# Patient Record
Sex: FEMALE | Race: WHITE | URBAN - METROPOLITAN AREA
[De-identification: names, ages, dates, MRNs, and addresses within clinical notes are randomized per-mention and may not be internally consistent; named-entity substitution may affect disease eponyms.]

---

## 2017-12-05 ENCOUNTER — INPATIENT (INPATIENT)
Facility: HOSPITAL | Age: 67
LOS: 2 days | Discharge: HOME | End: 2017-12-08
Attending: SPECIALIST | Admitting: SPECIALIST
Payer: MEDICAID

## 2017-12-05 VITALS
HEIGHT: 63 IN | DIASTOLIC BLOOD PRESSURE: 73 MMHG | WEIGHT: 139.99 LBS | SYSTOLIC BLOOD PRESSURE: 132 MMHG | TEMPERATURE: 101 F | OXYGEN SATURATION: 100 % | RESPIRATION RATE: 14 BRPM | HEART RATE: 115 BPM

## 2017-12-05 DIAGNOSIS — A41.9 SEPSIS, UNSPECIFIED ORGANISM: ICD-10-CM

## 2017-12-05 DIAGNOSIS — Z90.49 ACQUIRED ABSENCE OF OTHER SPECIFIED PARTS OF DIGESTIVE TRACT: Chronic | ICD-10-CM

## 2017-12-05 LAB
ALBUMIN SERPL ELPH-MCNC: 4.2 G/DL — SIGNIFICANT CHANGE UP (ref 3.3–5)
ALP SERPL-CCNC: 180 U/L — HIGH (ref 40–120)
ALT FLD-CCNC: 163 U/L — HIGH (ref 4–33)
ANISOCYTOSIS BLD QL: SIGNIFICANT CHANGE UP
APPEARANCE UR: SIGNIFICANT CHANGE UP
APTT BLD: 29.3 SEC — SIGNIFICANT CHANGE UP (ref 27.5–37.4)
AST SERPL-CCNC: 237 U/L — HIGH (ref 4–32)
BASE EXCESS BLDV CALC-SCNC: 2.9 MMOL/L — SIGNIFICANT CHANGE UP
BASOPHILS # BLD AUTO: 0.01 K/UL — SIGNIFICANT CHANGE UP (ref 0–0.2)
BASOPHILS NFR BLD AUTO: 0.1 % — SIGNIFICANT CHANGE UP (ref 0–2)
BASOPHILS NFR SPEC: 0 % — SIGNIFICANT CHANGE UP (ref 0–2)
BILIRUB SERPL-MCNC: 5.4 MG/DL — HIGH (ref 0.2–1.2)
BILIRUB UR-MCNC: NEGATIVE — SIGNIFICANT CHANGE UP
BLASTS # FLD: 0 % — SIGNIFICANT CHANGE UP (ref 0–0)
BLD GP AB SCN SERPL QL: NEGATIVE — SIGNIFICANT CHANGE UP
BLOOD GAS VENOUS - CREATININE: SIGNIFICANT CHANGE UP MG/DL (ref 0.5–1.3)
BLOOD UR QL VISUAL: HIGH
BUN SERPL-MCNC: 13 MG/DL — SIGNIFICANT CHANGE UP (ref 7–23)
CALCIUM SERPL-MCNC: 9.6 MG/DL — SIGNIFICANT CHANGE UP (ref 8.4–10.5)
CHLORIDE BLDV-SCNC: 103 MMOL/L — SIGNIFICANT CHANGE UP (ref 96–108)
CHLORIDE SERPL-SCNC: 98 MMOL/L — SIGNIFICANT CHANGE UP (ref 98–107)
CO2 SERPL-SCNC: 25 MMOL/L — SIGNIFICANT CHANGE UP (ref 22–31)
COLOR SPEC: YELLOW — SIGNIFICANT CHANGE UP
CREAT SERPL-MCNC: 0.92 MG/DL — SIGNIFICANT CHANGE UP (ref 0.5–1.3)
DACRYOCYTES BLD QL SMEAR: SLIGHT — SIGNIFICANT CHANGE UP
EOSINOPHIL # BLD AUTO: 0.01 K/UL — SIGNIFICANT CHANGE UP (ref 0–0.5)
EOSINOPHIL NFR BLD AUTO: 0.1 % — SIGNIFICANT CHANGE UP (ref 0–6)
EOSINOPHIL NFR FLD: 0 % — SIGNIFICANT CHANGE UP (ref 0–6)
GAS PNL BLDV: 139 MMOL/L — SIGNIFICANT CHANGE UP (ref 136–146)
GIANT PLATELETS BLD QL SMEAR: PRESENT — SIGNIFICANT CHANGE UP
GLUCOSE BLDV-MCNC: 104 — HIGH (ref 70–99)
GLUCOSE SERPL-MCNC: 105 MG/DL — HIGH (ref 70–99)
GLUCOSE UR-MCNC: NEGATIVE — SIGNIFICANT CHANGE UP
HCO3 BLDV-SCNC: 25 MMOL/L — SIGNIFICANT CHANGE UP (ref 20–27)
HCT VFR BLD CALC: 34.8 % — SIGNIFICANT CHANGE UP (ref 34.5–45)
HCT VFR BLDV CALC: 34.6 % — SIGNIFICANT CHANGE UP (ref 34.5–45)
HGB BLD-MCNC: 10.7 G/DL — LOW (ref 11.5–15.5)
HGB BLDV-MCNC: 11.2 G/DL — LOW (ref 11.5–15.5)
HYPOCHROMIA BLD QL: SLIGHT — SIGNIFICANT CHANGE UP
IMM GRANULOCYTES # BLD AUTO: 0.04 # — SIGNIFICANT CHANGE UP
IMM GRANULOCYTES NFR BLD AUTO: 0.4 % — SIGNIFICANT CHANGE UP (ref 0–1.5)
INR BLD: 1.05 — SIGNIFICANT CHANGE UP (ref 0.88–1.17)
KETONES UR-MCNC: SIGNIFICANT CHANGE UP
LACTATE BLDV-MCNC: 1.7 MMOL/L — SIGNIFICANT CHANGE UP (ref 0.5–2)
LEUKOCYTE ESTERASE UR-ACNC: HIGH
LIDOCAIN IGE QN: 26.3 U/L — SIGNIFICANT CHANGE UP (ref 7–60)
LYMPHOCYTES # BLD AUTO: 1.88 K/UL — SIGNIFICANT CHANGE UP (ref 1–3.3)
LYMPHOCYTES # BLD AUTO: 18 % — SIGNIFICANT CHANGE UP (ref 13–44)
LYMPHOCYTES NFR SPEC AUTO: 13 % — SIGNIFICANT CHANGE UP (ref 13–44)
MCHC RBC-ENTMCNC: 20.9 PG — LOW (ref 27–34)
MCHC RBC-ENTMCNC: 30.7 % — LOW (ref 32–36)
MCV RBC AUTO: 67.8 FL — LOW (ref 80–100)
METAMYELOCYTES # FLD: 0 % — SIGNIFICANT CHANGE UP (ref 0–1)
MICROCYTES BLD QL: SIGNIFICANT CHANGE UP
MONOCYTES # BLD AUTO: 0.78 K/UL — SIGNIFICANT CHANGE UP (ref 0–0.9)
MONOCYTES NFR BLD AUTO: 7.5 % — SIGNIFICANT CHANGE UP (ref 2–14)
MONOCYTES NFR BLD: 3.5 % — SIGNIFICANT CHANGE UP (ref 2–9)
MUCOUS THREADS # UR AUTO: SIGNIFICANT CHANGE UP
MYELOCYTES NFR BLD: 0 % — SIGNIFICANT CHANGE UP (ref 0–0)
NEUTROPHIL AB SER-ACNC: 78.3 % — HIGH (ref 43–77)
NEUTROPHILS # BLD AUTO: 7.7 K/UL — HIGH (ref 1.8–7.4)
NEUTROPHILS NFR BLD AUTO: 73.9 % — SIGNIFICANT CHANGE UP (ref 43–77)
NEUTS BAND # BLD: 0 % — SIGNIFICANT CHANGE UP (ref 0–6)
NITRITE UR-MCNC: NEGATIVE — SIGNIFICANT CHANGE UP
NON-SQ EPI CELLS # UR AUTO: 3 — SIGNIFICANT CHANGE UP
NRBC # FLD: 0 — SIGNIFICANT CHANGE UP
OTHER - HEMATOLOGY %: 0 — SIGNIFICANT CHANGE UP
OVALOCYTES BLD QL SMEAR: SLIGHT — SIGNIFICANT CHANGE UP
PCO2 BLDV: 50 MMHG — SIGNIFICANT CHANGE UP (ref 41–51)
PH BLDV: 7.36 PH — SIGNIFICANT CHANGE UP (ref 7.32–7.43)
PH UR: 5.5 — SIGNIFICANT CHANGE UP (ref 4.6–8)
PLATELET # BLD AUTO: 216 K/UL — SIGNIFICANT CHANGE UP (ref 150–400)
PLATELET COUNT - ESTIMATE: NORMAL — SIGNIFICANT CHANGE UP
PMV BLD: 10.6 FL — SIGNIFICANT CHANGE UP (ref 7–13)
PO2 BLDV: 23 MMHG — LOW (ref 35–40)
POIKILOCYTOSIS BLD QL AUTO: SLIGHT — SIGNIFICANT CHANGE UP
POTASSIUM BLDV-SCNC: 4.1 MMOL/L — SIGNIFICANT CHANGE UP (ref 3.4–4.5)
POTASSIUM SERPL-MCNC: 4 MMOL/L — SIGNIFICANT CHANGE UP (ref 3.5–5.3)
POTASSIUM SERPL-SCNC: 4 MMOL/L — SIGNIFICANT CHANGE UP (ref 3.5–5.3)
PROMYELOCYTES # FLD: 0 % — SIGNIFICANT CHANGE UP (ref 0–0)
PROT SERPL-MCNC: 8.7 G/DL — HIGH (ref 6–8.3)
PROT UR-MCNC: 30 — HIGH
PROTHROM AB SERPL-ACNC: 11.8 SEC — SIGNIFICANT CHANGE UP (ref 9.8–13.1)
RBC # BLD: 5.13 M/UL — SIGNIFICANT CHANGE UP (ref 3.8–5.2)
RBC # FLD: 15.4 % — HIGH (ref 10.3–14.5)
RBC CASTS # UR COMP ASSIST: SIGNIFICANT CHANGE UP (ref 0–?)
RH IG SCN BLD-IMP: POSITIVE — SIGNIFICANT CHANGE UP
SAO2 % BLDV: 35.4 % — LOW (ref 60–85)
SODIUM SERPL-SCNC: 138 MMOL/L — SIGNIFICANT CHANGE UP (ref 135–145)
SP GR SPEC: 1.02 — SIGNIFICANT CHANGE UP (ref 1–1.04)
SQUAMOUS # UR AUTO: SIGNIFICANT CHANGE UP
UROBILINOGEN FLD QL: NORMAL E.U. — SIGNIFICANT CHANGE UP (ref 0.1–0.2)
VARIANT LYMPHS # BLD: 5.2 % — SIGNIFICANT CHANGE UP
WBC # BLD: 10.42 K/UL — SIGNIFICANT CHANGE UP (ref 3.8–10.5)
WBC # FLD AUTO: 10.42 K/UL — SIGNIFICANT CHANGE UP (ref 3.8–10.5)
WBC UR QL: >50 — HIGH (ref 0–?)

## 2017-12-05 PROCEDURE — 99223 1ST HOSP IP/OBS HIGH 75: CPT

## 2017-12-05 PROCEDURE — 74177 CT ABD & PELVIS W/CONTRAST: CPT | Mod: 26

## 2017-12-05 RX ORDER — CEFAZOLIN SODIUM 1 G
2000 VIAL (EA) INJECTION ONCE
Qty: 0 | Refills: 0 | Status: COMPLETED | OUTPATIENT
Start: 2017-12-05 | End: 2017-12-05

## 2017-12-05 RX ORDER — FAMOTIDINE 10 MG/ML
20 INJECTION INTRAVENOUS ONCE
Qty: 0 | Refills: 0 | Status: COMPLETED | OUTPATIENT
Start: 2017-12-05 | End: 2017-12-05

## 2017-12-05 RX ORDER — METRONIDAZOLE 500 MG
500 TABLET ORAL ONCE
Qty: 0 | Refills: 0 | Status: COMPLETED | OUTPATIENT
Start: 2017-12-05 | End: 2017-12-05

## 2017-12-05 RX ORDER — MORPHINE SULFATE 50 MG/1
4 CAPSULE, EXTENDED RELEASE ORAL ONCE
Qty: 0 | Refills: 0 | Status: DISCONTINUED | OUTPATIENT
Start: 2017-12-05 | End: 2017-12-05

## 2017-12-05 RX ORDER — LEVOTHYROXINE SODIUM 125 MCG
75 TABLET ORAL DAILY
Qty: 0 | Refills: 0 | Status: DISCONTINUED | OUTPATIENT
Start: 2017-12-05 | End: 2017-12-08

## 2017-12-05 RX ORDER — SODIUM CHLORIDE 9 MG/ML
200 INJECTION INTRAMUSCULAR; INTRAVENOUS; SUBCUTANEOUS ONCE
Qty: 0 | Refills: 0 | Status: COMPLETED | OUTPATIENT
Start: 2017-12-05 | End: 2017-12-05

## 2017-12-05 RX ORDER — PANTOPRAZOLE SODIUM 20 MG/1
40 TABLET, DELAYED RELEASE ORAL DAILY
Qty: 0 | Refills: 0 | Status: DISCONTINUED | OUTPATIENT
Start: 2017-12-05 | End: 2017-12-08

## 2017-12-05 RX ORDER — SODIUM CHLORIDE 9 MG/ML
2000 INJECTION INTRAMUSCULAR; INTRAVENOUS; SUBCUTANEOUS ONCE
Qty: 0 | Refills: 0 | Status: COMPLETED | OUTPATIENT
Start: 2017-12-05 | End: 2017-12-05

## 2017-12-05 RX ORDER — ACETAMINOPHEN 500 MG
1000 TABLET ORAL ONCE
Qty: 0 | Refills: 0 | Status: COMPLETED | OUTPATIENT
Start: 2017-12-05 | End: 2017-12-05

## 2017-12-05 RX ORDER — ACETAMINOPHEN 500 MG
650 TABLET ORAL EVERY 12 HOURS
Qty: 0 | Refills: 0 | Status: DISCONTINUED | OUTPATIENT
Start: 2017-12-05 | End: 2017-12-08

## 2017-12-05 RX ORDER — HEPARIN SODIUM 5000 [USP'U]/ML
5000 INJECTION INTRAVENOUS; SUBCUTANEOUS EVERY 12 HOURS
Qty: 0 | Refills: 0 | Status: DISCONTINUED | OUTPATIENT
Start: 2017-12-05 | End: 2017-12-08

## 2017-12-05 RX ORDER — SODIUM CHLORIDE 9 MG/ML
1000 INJECTION INTRAMUSCULAR; INTRAVENOUS; SUBCUTANEOUS
Qty: 0 | Refills: 0 | Status: DISCONTINUED | OUTPATIENT
Start: 2017-12-05 | End: 2017-12-08

## 2017-12-05 RX ORDER — CIPROFLOXACIN LACTATE 400MG/40ML
400 VIAL (ML) INTRAVENOUS EVERY 12 HOURS
Qty: 0 | Refills: 0 | Status: DISCONTINUED | OUTPATIENT
Start: 2017-12-06 | End: 2017-12-07

## 2017-12-05 RX ORDER — METRONIDAZOLE 500 MG
500 TABLET ORAL EVERY 8 HOURS
Qty: 0 | Refills: 0 | Status: DISCONTINUED | OUTPATIENT
Start: 2017-12-06 | End: 2017-12-06

## 2017-12-05 RX ORDER — ONDANSETRON 8 MG/1
4 TABLET, FILM COATED ORAL ONCE
Qty: 0 | Refills: 0 | Status: COMPLETED | OUTPATIENT
Start: 2017-12-05 | End: 2017-12-05

## 2017-12-05 RX ORDER — ONDANSETRON 8 MG/1
4 TABLET, FILM COATED ORAL EVERY 8 HOURS
Qty: 0 | Refills: 0 | Status: DISCONTINUED | OUTPATIENT
Start: 2017-12-05 | End: 2017-12-08

## 2017-12-05 RX ORDER — LACTOBACILLUS ACIDOPHILUS 100MM CELL
1 CAPSULE ORAL EVERY 12 HOURS
Qty: 0 | Refills: 0 | Status: DISCONTINUED | OUTPATIENT
Start: 2017-12-05 | End: 2017-12-06

## 2017-12-05 RX ORDER — ACETAMINOPHEN 500 MG
975 TABLET ORAL ONCE
Qty: 0 | Refills: 0 | Status: DISCONTINUED | OUTPATIENT
Start: 2017-12-05 | End: 2017-12-05

## 2017-12-05 RX ADMIN — SODIUM CHLORIDE 2000 MILLILITER(S): 9 INJECTION INTRAMUSCULAR; INTRAVENOUS; SUBCUTANEOUS at 16:28

## 2017-12-05 RX ADMIN — MORPHINE SULFATE 4 MILLIGRAM(S): 50 CAPSULE, EXTENDED RELEASE ORAL at 16:28

## 2017-12-05 RX ADMIN — MORPHINE SULFATE 4 MILLIGRAM(S): 50 CAPSULE, EXTENDED RELEASE ORAL at 17:30

## 2017-12-05 RX ADMIN — ONDANSETRON 4 MILLIGRAM(S): 8 TABLET, FILM COATED ORAL at 16:28

## 2017-12-05 RX ADMIN — Medication 400 MILLIGRAM(S): at 16:20

## 2017-12-05 RX ADMIN — Medication 100 MILLIGRAM(S): at 16:20

## 2017-12-05 RX ADMIN — Medication 100 MILLIGRAM(S): at 17:35

## 2017-12-05 RX ADMIN — FAMOTIDINE 20 MILLIGRAM(S): 10 INJECTION INTRAVENOUS at 17:49

## 2017-12-05 NOTE — ED PROVIDER NOTE - OBJECTIVE STATEMENT
66yo F hx of gastritis, HLD, p/w RLQ pain. Started yesterday, progressively worsened, worse with movement, improves when laying still, hasn't taken anything for the pain. Has never had this pain before. + nausea, chills. One episode of watery diarrhea yesterday. Decreased appetite since yesterday. Denies vaginal discharge, back pain, urinary symptoms, vomiting. 66yo F hx of gastritis, HLD, p/w RLQ pain. Started yesterday, progressively worsened, worse with movement, improves when laying still, hasn't taken anything for the pain. Has never had this pain before. + nausea, chills. One episode of watery diarrhea yesterday. Decreased appetite since yesterday. Denies vaginal discharge, back pain, urinary symptoms, vomiting. Son brought in CTAP results from Nov 22nd (for eval of dysphagia) which didn't show abd pathology.

## 2017-12-05 NOTE — ED ADULT NURSE NOTE - OBJECTIVE STATEMENT
pt received a&ox3, c/o right lower quad abd pain, NV, one episode of diarrhea since yesterday, pt appears to be in NAD, denies urinary symptoms, pt is menopausal, noted to be febrile, 20 gauge IV placed in left ac, labs drawn and sent, pt placed on monitor, will continue to monitor.

## 2017-12-05 NOTE — ED PROVIDER NOTE - NS ED ROS FT
Constitutional: no fevers, chills  HEENT: no visual changes, no sore throat, no rhinorrhea  CV: no cp  Resp: no sob  GI: + abd pain, +nausea, +diarrhea  : no dysuria, hematuria  MSK: no joint pains  skin: no rashes  neuro: no HA, no confusion  psych: no SI/HI

## 2017-12-05 NOTE — ED PROVIDER NOTE - DISPOSITION TYPE
24    Re: Renee Paris  : 1995      To Whom It May Concern:  Renee Paris is under my care and has restrictions not to lift greater than 20 lbs until further notice.  She will need to be off work for two weeks due to her medical condition.     If you have any questions concerning this letter, please feel free to contact my office.      Sincerely yours,    MIYA Green  
ADMIT

## 2017-12-05 NOTE — ED PROVIDER NOTE - PHYSICAL EXAMINATION
Vitals: febrile, tachy, htn, remainder of vitals wnl  Gen: laying comfortably in NAD  Head: NCAT  ENT: sclerae white, anicterus, moist mucous membranes. No exudates.  CV: RRR. Audible S1 and S2. No murmurs, rubs, gallops, S3, nor S4,  Pulm: Clear to auscultation bilaterally. No wheezes, rales, or rhonchi  Abd: soft, normoactive BS x4, RLQ ttp, no rebound, no guarding  Pelvic: white vaginal discharge, no lesions in cervix, os closed, + CMT  Musculoskeletal:  No peripheral edema  Skin: no lesions or scars noted  Neurologic: responds to questions appropriately

## 2017-12-05 NOTE — H&P ADULT - MUSCULOSKELETAL
details… detailed exam no joint swelling/no joint warmth/no calf tenderness/no joint erythema/ROM intact

## 2017-12-05 NOTE — ED PROVIDER NOTE - MEDICAL DECISION MAKING DETAILS
66yo F hx of gastritis, HLD, p/w RLQ pain. Ddx include appy, ovarian torsion, ovarian abscess, gastroenteritis, colitis, nephrolithiasis. Most likely appy as pt has nausea, decreased appetite, diarrhea. Will eval with ctap, labs, ua, ivfs, abx, reassess.

## 2017-12-05 NOTE — H&P ADULT - PROBLEM SELECTOR PLAN 1
Surgery Consult, was called, [facility] , NPO, IVF NS Surgery Consult was called, [facility] in AM , NPO, IVF NS, IV Cipro, IV Flagyl, Bacid  F/U CBC, CMP, Cultures,  R/O Malignancy, CEA, CA 19-9, , AFP , Amylase, Lipase, LDH, Occult Stool,  Fall/aspiration precaution ,  + Anemia: Iron Studies, Vit B12, Folate, Ferritin, Hgb Electrophoresis, LDH, Haptoglobin , Occult Stool, R/O Colon CA,  High LFT: Jaundice, Amylase, Lipase, High Total Bili , Hep A,B,C profile, IVF NS,   Chest X ray , HIV test, ECG

## 2017-12-05 NOTE — ED PROVIDER NOTE - ATTENDING CONTRIBUTION TO CARE
68yo F PMH: gastritis, HLD, p/w RLQ pain x 2d, nausea without vomiting, loose BM x 1, no prior similar symptoms  On exam awake & alert, mild distress, mmm, lungs CTAB, RRR, abdomen soft RLQ tenderness, no rebound, mild guarding, 2+ pulses b/l, neuro A&Ox3, no focal deficits, skin warm and dry no rash

## 2017-12-05 NOTE — H&P ADULT - HISTORY OF PRESENT ILLNESS
68 y/o Female  hx of gastritis, HLD,  Chronic LBP, Hypothyroid, S/P EGD 3-4 weeks ago c/w gastritis as per pt, Last Colonoscopy many years ago as per pt, , S/P Cholecystectomy ,  p/w RLQ pain x 2 days ,  progressively worsened, worse with movement, improves when laying still, hasn't taken anything for the pain. Has never had this pain before + nausea , chills, + Fever, No Vomit, NO CP, NO SOB, + HA, no dizziness, no cough, No Recent travel, no sick contact,  One episode of watery diarrhea today,  Decreased appetite since yesterday,  Denies vaginal discharge,  urinary symptoms, vomiting. Son brought in CTAP results from Nov 22nd (for eval of dysphagia) which didn't show abd pathology as per ER note? NO Dysuria, S/P CT A/[facility] done  in [facility]  C/W possible Cecal Diverticulitis, R/O Carcinoma as well, UA + as well, High LFT, High Total Bili, + Mildly Jaundice , + Mild Anemia as per lab as well, with LOW MCV, S/P IV Ancef and IV Flagyl tonight, I called surgery consult, pt awake, A+O x 3, S/P IV Morphine for pain with help, NPO, IVF NS, 68 y/o Female  hx of gastritis, HLD,  Chronic LBP, Hypothyroid, S/P EGD 3-4 weeks ago c/w gastritis as per pt, Last Colonoscopy many years ago as per pt, , S/P Cholecystectomy ,  p/w RLQ pain x 2 days ,  progressively worsened, worse with movement, improves when laying still, hasn't taken anything for the pain. Has never had this pain before + nausea , chills, + Fever, No Vomit, NO CP, NO SOB, + HA, no dizziness, no cough, No Recent travel, no sick contact,  One episode of watery diarrhea today,  Decreased appetite since yesterday,  Denies vaginal discharge,  urinary symptoms, vomiting. Son brought in CTAP results from Nov 22nd (for eval of dysphagia) which didn't show abd pathology as per ER note? NO Dysuria, S/P CT A/P done  in [facility]  C/W possible Cecal Diverticulitis, R/O Carcinoma as well, UA + as well, High LFT, High Total Bili, + Mildly Jaundice , + Mild Anemia as per lab as well, with LOW MCV, S/P IV Ancef and IV Flagyl tonight, I called surgery consult, pt awake, A+O x 3, S/P IV Morphine for pain with help, NPO, IVF NS,     Labs: UA: Hazy, Mod Ketone, [id number], Large Leuk Est, WBC > 50, Na 138, K+ 4, BUN 13, Creatinine 0.92, Glucose 105, total Bili 5.4, alk Phos 180, , , WBC 10.42, Hgb 10.7, MCV 67.8, Platelet 216 , PT 11.8, INR 1.05, PTT  29.3, Lactate 1.7     Vitals: T .4, HR 65, /61, RR  18, 100 % RA

## 2017-12-05 NOTE — H&P ADULT - ASSESSMENT
66 y/o Female  hx of gastritis, HLD,  Chronic LBP, Hypothyroid, S/P EGD 3-4 weeks ago c/w gastritis as per pt, Last Colonoscopy many years ago as per pt, , S/P Cholecystectomy ,  p/w RLQ pain x 2 days ,  progressively worsened, worse with movement, improves when laying still, hasn't taken anything for the pain. Has never had this pain before + nausea , chills, + Fever, No Vomit, NO CP, NO SOB, + HA, no dizziness, no cough, No Recent travel, no sick contact,  One episode of watery diarrhea today,  Decreased appetite since yesterday,  Denies vaginal discharge,  urinary symptoms, vomiting. Son brought in CTAP results from Nov 22nd (for eval of dysphagia) which didn't show abd pathology as per ER note? NO Dysuria, S/P CT A/[facility]  in [facility]  C/W possible Cecal Diverticulitis, R/O Carcinoma as well, UA + as well, High LFT, High Total Bili, + Mildly Jaundice , + Mild Anemia as per lab as well, with LOW MCV, S/P IV Ancef and IV Flagyl tonight, I called surgery consult, pt awake, A+O x 3, S/P IV Morphine for pain with help, NPO, IVF NS,

## 2017-12-06 DIAGNOSIS — R79.89 OTHER SPECIFIED ABNORMAL FINDINGS OF BLOOD CHEMISTRY: ICD-10-CM

## 2017-12-06 DIAGNOSIS — E03.9 HYPOTHYROIDISM, UNSPECIFIED: ICD-10-CM

## 2017-12-06 DIAGNOSIS — K29.70 GASTRITIS, UNSPECIFIED, WITHOUT BLEEDING: ICD-10-CM

## 2017-12-06 DIAGNOSIS — Z29.9 ENCOUNTER FOR PROPHYLACTIC MEASURES, UNSPECIFIED: ICD-10-CM

## 2017-12-06 DIAGNOSIS — N39.0 URINARY TRACT INFECTION, SITE NOT SPECIFIED: ICD-10-CM

## 2017-12-06 DIAGNOSIS — M54.5 LOW BACK PAIN: ICD-10-CM

## 2017-12-06 DIAGNOSIS — E78.00 PURE HYPERCHOLESTEROLEMIA, UNSPECIFIED: ICD-10-CM

## 2017-12-06 DIAGNOSIS — K52.9 NONINFECTIVE GASTROENTERITIS AND COLITIS, UNSPECIFIED: ICD-10-CM

## 2017-12-06 DIAGNOSIS — K57.32 DIVERTICULITIS OF LARGE INTESTINE WITHOUT PERFORATION OR ABSCESS WITHOUT BLEEDING: ICD-10-CM

## 2017-12-06 LAB
AFP-TM SERPL-MCNC: 2.1 NG/ML — SIGNIFICANT CHANGE UP
ALBUMIN SERPL ELPH-MCNC: 3.1 G/DL — LOW (ref 3.3–5)
ALP SERPL-CCNC: 175 U/L — HIGH (ref 40–120)
ALT FLD-CCNC: 114 U/L — HIGH (ref 4–33)
AMYLASE P1 CFR SERPL: 43 U/L — SIGNIFICANT CHANGE UP (ref 25–125)
AST SERPL-CCNC: 145 U/L — HIGH (ref 4–32)
BASOPHILS # BLD AUTO: 0.03 K/UL — SIGNIFICANT CHANGE UP (ref 0–0.2)
BASOPHILS NFR BLD AUTO: 0.4 % — SIGNIFICANT CHANGE UP (ref 0–2)
BILIRUB SERPL-MCNC: 5.3 MG/DL — HIGH (ref 0.2–1.2)
BLD GP AB SCN SERPL QL: NEGATIVE — SIGNIFICANT CHANGE UP
BUN SERPL-MCNC: 8 MG/DL — SIGNIFICANT CHANGE UP (ref 7–23)
CALCIUM SERPL-MCNC: 8.3 MG/DL — LOW (ref 8.4–10.5)
CANCER AG125 SERPL-ACNC: 12 U/ML — SIGNIFICANT CHANGE UP
CEA SERPL-MCNC: < 1 NG/ML — LOW (ref 1–3.8)
CHLORIDE SERPL-SCNC: 108 MMOL/L — HIGH (ref 98–107)
CHOLEST SERPL-MCNC: 184 MG/DL — SIGNIFICANT CHANGE UP (ref 120–199)
CK SERPL-CCNC: 67 U/L — SIGNIFICANT CHANGE UP (ref 25–170)
CO2 SERPL-SCNC: 23 MMOL/L — SIGNIFICANT CHANGE UP (ref 22–31)
CREAT SERPL-MCNC: 0.7 MG/DL — SIGNIFICANT CHANGE UP (ref 0.5–1.3)
EOSINOPHIL # BLD AUTO: 0.09 K/UL — SIGNIFICANT CHANGE UP (ref 0–0.5)
EOSINOPHIL NFR BLD AUTO: 1.1 % — SIGNIFICANT CHANGE UP (ref 0–6)
FOLATE SERPL-MCNC: > 20 NG/ML — HIGH (ref 4.7–20)
GLUCOSE SERPL-MCNC: 87 MG/DL — SIGNIFICANT CHANGE UP (ref 70–99)
HAPTOGLOB SERPL-MCNC: 205 MG/DL — HIGH (ref 34–200)
HAV IGM SER-ACNC: NONREACTIVE — SIGNIFICANT CHANGE UP
HBA1C BLD-MCNC: 6.3 % — HIGH (ref 4–5.6)
HBV CORE IGM SER-ACNC: NONREACTIVE — SIGNIFICANT CHANGE UP
HBV SURFACE AG SER-ACNC: NONREACTIVE — SIGNIFICANT CHANGE UP
HCT VFR BLD CALC: 30.5 % — LOW (ref 34.5–45)
HCV AB S/CO SERPL IA: 0.21 S/CO — SIGNIFICANT CHANGE UP
HCV AB SERPL-IMP: SIGNIFICANT CHANGE UP
HDLC SERPL-MCNC: 70 MG/DL — HIGH (ref 45–65)
HGB BLD-MCNC: 9.5 G/DL — LOW (ref 11.5–15.5)
IMM GRANULOCYTES # BLD AUTO: 0.03 # — SIGNIFICANT CHANGE UP
IMM GRANULOCYTES NFR BLD AUTO: 0.4 % — SIGNIFICANT CHANGE UP (ref 0–1.5)
IRON SATN MFR SERPL: 106 UG/DL — SIGNIFICANT CHANGE UP (ref 30–160)
IRON SATN MFR SERPL: 265 UG/DL — SIGNIFICANT CHANGE UP (ref 140–530)
LDH SERPL L TO P-CCNC: 318 U/L — HIGH (ref 135–225)
LIDOCAIN IGE QN: 22.6 U/L — SIGNIFICANT CHANGE UP (ref 7–60)
LIPID PNL WITH DIRECT LDL SERPL: 124 MG/DL — SIGNIFICANT CHANGE UP
LYMPHOCYTES # BLD AUTO: 2.56 K/UL — SIGNIFICANT CHANGE UP (ref 1–3.3)
LYMPHOCYTES # BLD AUTO: 30.3 % — SIGNIFICANT CHANGE UP (ref 13–44)
MAGNESIUM SERPL-MCNC: 1.9 MG/DL — SIGNIFICANT CHANGE UP (ref 1.6–2.6)
MCHC RBC-ENTMCNC: 21.8 PG — LOW (ref 27–34)
MCHC RBC-ENTMCNC: 31.1 % — LOW (ref 32–36)
MCV RBC AUTO: 70 FL — LOW (ref 80–100)
MONOCYTES # BLD AUTO: 0.85 K/UL — SIGNIFICANT CHANGE UP (ref 0–0.9)
MONOCYTES NFR BLD AUTO: 10 % — SIGNIFICANT CHANGE UP (ref 2–14)
NEUTROPHILS # BLD AUTO: 4.9 K/UL — SIGNIFICANT CHANGE UP (ref 1.8–7.4)
NEUTROPHILS NFR BLD AUTO: 57.8 % — SIGNIFICANT CHANGE UP (ref 43–77)
NRBC # FLD: 0 — SIGNIFICANT CHANGE UP
PHOSPHATE SERPL-MCNC: 3 MG/DL — SIGNIFICANT CHANGE UP (ref 2.5–4.5)
PLATELET # BLD AUTO: 176 K/UL — SIGNIFICANT CHANGE UP (ref 150–400)
PMV BLD: 11.3 FL — SIGNIFICANT CHANGE UP (ref 7–13)
POTASSIUM SERPL-MCNC: 4.6 MMOL/L — SIGNIFICANT CHANGE UP (ref 3.5–5.3)
POTASSIUM SERPL-SCNC: 4.6 MMOL/L — SIGNIFICANT CHANGE UP (ref 3.5–5.3)
PROT SERPL-MCNC: 6.9 G/DL — SIGNIFICANT CHANGE UP (ref 6–8.3)
RBC # BLD: 4.36 M/UL — SIGNIFICANT CHANGE UP (ref 3.8–5.2)
RBC # FLD: 15.5 % — HIGH (ref 10.3–14.5)
RH IG SCN BLD-IMP: POSITIVE — SIGNIFICANT CHANGE UP
SODIUM SERPL-SCNC: 141 MMOL/L — SIGNIFICANT CHANGE UP (ref 135–145)
SPECIMEN SOURCE: SIGNIFICANT CHANGE UP
SPECIMEN SOURCE: SIGNIFICANT CHANGE UP
T4 FREE SERPL-MCNC: 1.15 NG/DL — SIGNIFICANT CHANGE UP (ref 0.9–1.8)
TRIGL SERPL-MCNC: 61 MG/DL — SIGNIFICANT CHANGE UP (ref 10–149)
TROPONIN T SERPL-MCNC: < 0.06 NG/ML — SIGNIFICANT CHANGE UP (ref 0–0.06)
TSH SERPL-MCNC: 1.45 UIU/ML — SIGNIFICANT CHANGE UP (ref 0.27–4.2)
UIBC SERPL-MCNC: 159 UG/DL — SIGNIFICANT CHANGE UP (ref 110–370)
VIT B12 SERPL-MCNC: 190 PG/ML — LOW (ref 200–900)
WBC # BLD: 8.46 K/UL — SIGNIFICANT CHANGE UP (ref 3.8–10.5)
WBC # FLD AUTO: 8.46 K/UL — SIGNIFICANT CHANGE UP (ref 3.8–10.5)

## 2017-12-06 PROCEDURE — 71010: CPT | Mod: 26

## 2017-12-06 PROCEDURE — 74182 MRI ABDOMEN W/CONTRAST: CPT | Mod: 26

## 2017-12-06 RX ORDER — LEVOTHYROXINE SODIUM 125 MCG
1 TABLET ORAL
Qty: 0 | Refills: 0 | COMMUNITY

## 2017-12-06 RX ORDER — ACETAMINOPHEN 500 MG
650 TABLET ORAL ONCE
Qty: 0 | Refills: 0 | Status: COMPLETED | OUTPATIENT
Start: 2017-12-06 | End: 2017-12-06

## 2017-12-06 RX ORDER — ESOMEPRAZOLE MAGNESIUM 40 MG/1
1 CAPSULE, DELAYED RELEASE ORAL
Qty: 0 | Refills: 0 | COMMUNITY

## 2017-12-06 RX ADMIN — HEPARIN SODIUM 5000 UNIT(S): 5000 INJECTION INTRAVENOUS; SUBCUTANEOUS at 06:22

## 2017-12-06 RX ADMIN — Medication 650 MILLIGRAM(S): at 16:30

## 2017-12-06 RX ADMIN — SODIUM CHLORIDE 100 MILLILITER(S): 9 INJECTION INTRAMUSCULAR; INTRAVENOUS; SUBCUTANEOUS at 01:14

## 2017-12-06 RX ADMIN — SODIUM CHLORIDE 100 MILLILITER(S): 9 INJECTION INTRAMUSCULAR; INTRAVENOUS; SUBCUTANEOUS at 21:24

## 2017-12-06 RX ADMIN — SODIUM CHLORIDE 100 MILLILITER(S): 9 INJECTION INTRAMUSCULAR; INTRAVENOUS; SUBCUTANEOUS at 12:09

## 2017-12-06 RX ADMIN — Medication 200 MILLIGRAM(S): at 17:50

## 2017-12-06 RX ADMIN — PANTOPRAZOLE SODIUM 40 MILLIGRAM(S): 20 TABLET, DELAYED RELEASE ORAL at 12:35

## 2017-12-06 RX ADMIN — Medication 75 MICROGRAM(S): at 06:22

## 2017-12-06 RX ADMIN — Medication 200 MILLIGRAM(S): at 06:22

## 2017-12-06 RX ADMIN — HEPARIN SODIUM 5000 UNIT(S): 5000 INJECTION INTRAVENOUS; SUBCUTANEOUS at 17:50

## 2017-12-06 RX ADMIN — Medication 650 MILLIGRAM(S): at 07:16

## 2017-12-06 NOTE — CONSULT NOTE ADULT - PROBLEM SELECTOR RECOMMENDATION 9
continue cipro as ordered  sent out C. dif and stool studies if has diarrhea again, no diarrhea since yesterday morning as per patient  can start CLD and monitor continue cipro as ordered  add flagyl tid  can start CLD and advance as tolerated  ct reviewed with radiology, this is likely cecal diverticulitis   would recommend 10-14 day course of antibiotics   outpatient colonoscopy in 8 weeks

## 2017-12-06 NOTE — CONSULT NOTE ADULT - SUBJECTIVE AND OBJECTIVE BOX
GENERAL SURGERY CONSULT NOTE    Patient is a 67y old  Female who presents with a chief complaint of RLQ pain, Fever, UTI, Cecal Diverticulitis (05 Dec 2017 22:33)      HPI:  66 yo F  hx of gastritis, HLD, Chronic LBP, Hypothyroid, S/P EGD 3-4 weeks ago c/w gastritis, Last Colonoscopy 7 years ago that was normal, s/p cholecystectomy , p/w RLQ pain x 2 days. Pt reports pain has been getting worse and is accompanied by 1 episode of diarrhea and  vomiting. no chest pain, SOB, dysuria, chills or fever. Work up in ED revealed a positive UTI and cecal thickening concerning for cecal diveriticulitis vs cancer. Pt was admitted to medicine and surgery was consulted due to abdominal pain and cecal thickening.       PAST MEDICAL & SURGICAL HISTORY:  High cholesterol  Gastritis  Hypothyroid  Low back ache  S/P cholecystectomy    [  ] No significant past history as reviewed with the patient and family    FAMILY HISTORY:  No pertinent family history in first degree relatives    [  ] Family history not pertinent as reviewed with the patient and family    SOCIAL HISTORY:    MEDICATIONS  (STANDING):  ciprofloxacin   IVPB 400 milliGRAM(s) IV Intermittent every 12 hours  heparin  Injectable 5000 Unit(s) SubCutaneous every 12 hours  lactobacillus acidophilus 1 Tablet(s) Oral every 12 hours  levothyroxine 75 MICROGram(s) Oral daily  metroNIDAZOLE  IVPB 500 milliGRAM(s) IV Intermittent every 8 hours  pantoprazole  Injectable 40 milliGRAM(s) IV Push daily  sodium chloride 0.9%. 1000 milliLiter(s) (100 mL/Hr) IV Continuous <Continuous>    MEDICATIONS  (PRN):  acetaminophen   Tablet 650 milliGRAM(s) Oral every 12 hours PRN For Temp greater than 38 C (100.4 F)  ondansetron Injectable 4 milliGRAM(s) IV Push every 8 hours PRN Nausea and/or Vomiting    Allergies    No Known Allergies    Intolerances    Vital Signs Last 24 Hrs  T(C): 36.2 (05 Dec 2017 21:15), Max: 38.6 (05 Dec 2017 14:57)  T(F): 97.1 (05 Dec 2017 21:15), Max: 101.4 (05 Dec 2017 14:57)  HR: 65 (05 Dec 2017 21:15) (65 - 115)  BP: 105/61 (05 Dec 2017 21:15) (105/61 - 152/78)  BP(mean): --  RR: 18 (05 Dec 2017 21:15) (14 - 18)  SpO2: 100% (05 Dec 2017 21:15) (100% - 100%)  Daily Height in cm: 160.02 (05 Dec 2017 14:57)    Daily     Exam:  General: awake, alert, NAD  HEENT: NCAT, MMM  Resp: nonlabored  Chest: equal chest rise  Abd: soft, ND, RLQ tenderness, no rebound, no guarding  Ext: OLSON  Neuro: intact                          10.7   10.42 )-----------( 216      ( 05 Dec 2017 16:05 )             34.8     12    138  |  98  |  13  ----------------------------<  105<H>  4.0   |  25  |  0.92    Ca    9.6      05 Dec 2017 16:05    TPro  8.7<H>  /  Alb  4.2  /  TBili  5.4<H>  /  DBili  x   /  AST  237<H>  /  ALT  163<H>  /  AlkPhos  180<H>  12-05    PT/INR - ( 05 Dec 2017 16:05 )   PT: 11.8 SEC;   INR: 1.05          PTT - ( 05 Dec 2017 16:05 )  PTT:29.3 SEC  Urinalysis Basic - ( 05 Dec 2017 16:21 )    Color: YELLOW / Appearance: HAZY / S.023 / pH: 5.5  Gluc: NEGATIVE / Ketone: MODERATE  / Bili: NEGATIVE / Urobili: NORMAL E.U.   Blood: MODERATE / Protein: 30 / Nitrite: NEGATIVE   Leuk Esterase: LARGE / RBC: 10-25 / WBC >50   Sq Epi: FEW / Non Sq Epi: x / Bacteria: x        IMAGING STUDIES:  < from: CT Abdomen and Pelvis w/ Oral Cont and w/ IV Cont (17 @ 19:40) >  MPRESSION:     There is no CT evidence of appendicitis.     Focal thickening is noted involving the base of the cecum. Exact etiology   is unclear. One of the differential diagnostic consideration includes a   cecal diverticulitis. Possibility of carcinoma is also considered.    < end of copied text >
Chief Complaint:  Patient is a 67y old  Female who presents with a chief complaint of RLQ pain, Fever, UTI, Cecal Diverticulitis (05 Dec 2017 22:33)      HPI:  66 yo F  hx of gastritis, HLD, Chronic LBP, Hypothyroid, S/P EGD 3-4 weeks ago c/w gastritis, Last Colonoscopy 7 years ago that was normal, s/p cholecystectomy , p/w RLQ pain x 2 days. Pt reports pain has been getting worse and is accompanied by 1 episode of diarrhea and  vomiting. no chest pain, SOB, dysuria, chills or fever. Work up in ED revealed a positive UTI and cecal thickening concerning for cecal diveriticulitis vs cancer.  Pt daughter says mom had EGD 4 weeks ago, and was normal.  Pt states that the abdominal pain is dcribed as sharp in the RLQ and LLQ, and with 1 associated movement of watery diarrhea, but no other symptoms      Allergies:  No Known Allergies      Medications:  acetaminophen   Tablet 650 milliGRAM(s) Oral every 12 hours PRN  ciprofloxacin   IVPB 400 milliGRAM(s) IV Intermittent every 12 hours  heparin  Injectable 5000 Unit(s) SubCutaneous every 12 hours  levothyroxine 75 MICROGram(s) Oral daily  ondansetron Injectable 4 milliGRAM(s) IV Push every 8 hours PRN  pantoprazole  Injectable 40 milliGRAM(s) IV Push daily  sodium chloride 0.9%. 1000 milliLiter(s) IV Continuous <Continuous>      PMHX/PSHX:  High cholesterol  Gastritis  Hypothyroid  Low back ache  S/P cholecystectomy      Family history:  No pertinent family history in first degree relatives      Social History:     ROS:     General:  No wt loss, fevers, chills, night sweats, fatigue,   Eyes:  Good vision, no reported pain  ENT:  No sore throat, pain, runny nose, dysphagia  CV:  No pain, palpitations, hypo/hypertension  Resp:  No dyspnea, cough, tachypnea, wheezing  GI:  No pain, No nausea, No vomiting, No diarrhea, No constipation, No weight loss, No fever, No pruritis, No rectal bleeding, No tarry stools, No dysphagia,  :  No pain, bleeding, incontinence, nocturia  Muscle:  No pain, weakness  Neuro:  No weakness, tingling, memory problems  Psych:  No fatigue, insomnia, mood problems, depression  Endocrine:  No polyuria, polydipsia, cold/heat intolerance  Heme:  No petechiae, ecchymosis, easy bruisability  Skin:  No rash, tattoos, scars, edema      PHYSICAL EXAM:   Vital Signs:  Vital Signs Last 24 Hrs  T(C): 36.7 (06 Dec 2017 08:35), Max: 38.6 (05 Dec 2017 14:57)  T(F): 98 (06 Dec 2017 08:35), Max: 101.4 (05 Dec 2017 14:57)  HR: 64 (06 Dec 2017 08:35) (63 - 115)  BP: 109/64 (06 Dec 2017 08:35) (105/61 - 152/78)  BP(mean): --  RR: 16 (06 Dec 2017 08:35) (14 - 18)  SpO2: 100% (06 Dec 2017 08:35) (100% - 100%)  Daily Height in cm: 160.02 (05 Dec 2017 14:57)    Daily     GENERAL:  Appears stated age, well-groomed, well-nourished, no distress  HEENT:  NC/AT,  conjunctivae clear and pink, no thyromegaly, nodules, adenopathy, no JVD, sclera -anicteric  CHEST:  Full & symmetric excursion, no increased effort, breath sounds clear  HEART:  Regular rhythm, S1, S2, no murmur/rub/S3/S4, no abdominal bruit, no edema  ABDOMEN:  Soft, non-tender, non-distended, normoactive bowel sounds,  no masses ,no hepato-splenomegaly, no signs of chronic liver disease  EXTEREMITIES:  no cyanosis,clubbing or edema  SKIN:  No rash/erythema/ecchymoses/petechiae/wounds/abscess/warm/dry  NEURO:  Alert, oriented, no asterixis, no tremor, no encephalopathy    LABS:                        9.5    8.46  )-----------( 176      ( 06 Dec 2017 05:37 )             30.5     12-    141  |  108<H>  |  8   ----------------------------<  87  4.6   |  23  |  0.70    Ca    8.3<L>      06 Dec 2017 05:37  Phos  3.0     12-  Mg     1.9     12-    TPro  6.9  /  Alb  3.1<L>  /  TBili  5.3<H>  /  DBili  x   /  AST  145<H>  /  ALT  114<H>  /  AlkPhos  175<H>  12-06    LIVER FUNCTIONS - ( 06 Dec 2017 05:37 )  Alb: 3.1 g/dL / Pro: 6.9 g/dL / ALK PHOS: 175 u/L / ALT: 114 u/L / AST: 145 u/L / GGT: x           PT/INR - ( 05 Dec 2017 16:05 )   PT: 11.8 SEC;   INR: 1.05          PTT - ( 05 Dec 2017 16:05 )  PTT:29.3 SEC  Urinalysis Basic - ( 05 Dec 2017 16:21 )    Color: YELLOW / Appearance: HAZY / S.023 / pH: 5.5  Gluc: NEGATIVE / Ketone: MODERATE  / Bili: NEGATIVE / Urobili: NORMAL E.U.   Blood: MODERATE / Protein: 30 / Nitrite: NEGATIVE   Leuk Esterase: LARGE / RBC: 10-25 / WBC >50   Sq Epi: FEW / Non Sq Epi: x / Bacteria: x      Amylase Serum43      Lipase serum22.6       Ammonia--  Amylase Serum--      Lipase serum26.3       Ammonia--      Imaging:    < from: CT Abdomen and Pelvis w/ Oral Cont and w/ IV Cont (17 @ 19:40) >  EXAM:  CT ABDOMEN AND PELVIS OC IC        PROCEDURE DATE:  Dec  5 2017         INTERPRETATION:  CLINICAL INFORMATION: Right lower quadrant pain x2 days.   Diarrhea. Evaluate for appendicitis.    COMPARISON: None available.    PROCEDURE:   CT of theAbdomen and Pelvis was performed with intravenous contrast.   Intravenous contrast: 90 ml Omnipaque 350. 10 ml discarded.  Oral contrast: positive contrast was administered.  Sagittal and coronal reformats were performed.    FINDINGS:    LOWER CHEST:Within normal limits.    LIVER: Within normal limits.  BILE DUCTS: Normal caliber.  GALLBLADDER: Within normal limits.  SPLEEN: Within normal limits.  PANCREAS: Within normal limits.  ADRENALS: Within normal limits.  KIDNEYS/URETERS: Within normal limits.    BLADDER: Within normal limits.  REPRODUCTIVE ORGANS: The uterus and adnexa are within normal limits.    BOWEL: Tiny hiatal hernia. Focal thickening is noted involving the base   of the cecum. Hazy changes/small amount of fluid are present in the   adjacent fat. The remainder of the colon contains small amount of stool.   The appendix is visualized and is unremarkable. Terminal ileum is also   unremarkable.   PERITONEUM: No ascites. Subcentimeter mesenteric lymph nodes.  VESSELS:  Atherosclerosis.  RETROPERITONEUM: No lymphadenopathy.    ABDOMINAL WALL: Within normal limits.  BONES: Within normal limits.    IMPRESSION:     There is no CT evidence of appendicitis.     Focal thickening is noted involving the base of the cecum. Exact etiology   is unclear. One of the differential diagnostic consideration includes a   cecal diverticulitis. Possibility of carcinoma is also considered.                  MARZENA AMADOR M.D., RADIOLOGY RESIDENT  This document has been electronically signed.  SANTA MOISE M.D., ATTENDING RADIOLOGIST  This document has been electronically signed. Dec  5 2017  8:34PM        < end of copied text >

## 2017-12-06 NOTE — ED ADULT NURSE REASSESSMENT NOTE - NS ED NURSE REASSESS COMMENT FT1
0835h  Pt received spot 20A.  AOX3, skin warm, dry and intact.  Pt in NAD, offers no complaints at this time.  maintained NPO except medications.  Same explained to pt, pt verbalized understanding.  SR up x 2.  IVF in progress via left AC 20 g, site appears dry and intact at this time.  continue to monitor. Pt awaiting bed assignment.

## 2017-12-06 NOTE — ED ADULT NURSE REASSESSMENT NOTE - NS ED NURSE REASSESS COMMENT FT1
received pt from NIKKIE Bo, c/o abdominal pain, VSS, due medication given as ordered. AM blood work for HIV antigen just finished at this time. Will continue to monitor.

## 2017-12-06 NOTE — CONSULT NOTE ADULT - PROBLEM SELECTOR RECOMMENDATION 2
MRI abdomen with cont ordered  s/p cholecystectomy years ago MRI abdomen with cont ordered (can be done as outpatient)   s/p cholecystectomy years ago  patient states chronically elevated lfts with elevated bilirubin

## 2017-12-06 NOTE — PHYSICAL THERAPY INITIAL EVALUATION ADULT - PHYSICAL ASSIST/NONPHYSICAL ASSIST: SIT/STAND, REHAB EVAL
September 7, 2017      MEMO Luther MD  [address]  [address]  [facility] [address]           [place] [facility]  [address]  [facility] [address]  Phone: [phone]          Patient: Aurelia Massey   MR Number: [id number]   YOB: 1942   Date of Visit: 9/7/2017       Dear Dr. MEMO Luther:    Thank you for referring Aurelia Massey to me for evaluation. Attached you will find relevant portions of my assessment and plan of care.    If you have questions, please do not hesitate to call me. I look forward to following Aurelia Massey along with you.    Sincerely,    Ryna Bronson MD    Enclosure  CC:  No Recipients    If you would like to receive this communication electronically, please contact [email or web address] or [phone] to request more information on [employer] Link access.    For providers and/or their staff who would like to refer a patient to [facility], please contact us through our one-stop-shop provider referral line, [facility], at [phone].    If you feel you have received this communication in error or would no longer like to receive these types of communications, please e-mail [email or web address]          1 person assist

## 2017-12-06 NOTE — CONSULT NOTE ADULT - ASSESSMENT
66yo F with UTI and cecal wall thickening in the setting of abdominal pain    - NPO/IVF  - IV abx  - GI evaluation and possible colonoscopy to evaluate cecal wall thickening  - serial abdominal exams  - will follow with you, please call with any acute change in status or exam    will discuss with Dr. Gloria POTTER Team   [id number] 68yo F with UTI and cecal wall thickening in the setting of abdominal pain    - NPO/IVF  - IV abx  - GI evaluation and possible colonoscopy to evaluate cecal wall thickening - Will call Dr. Hanson and colleagues in am  - serial abdominal exams  - please transfer patient to surgical service under Dr. Castro - discussed with medical team    discussed with Dr. Gloria DWYER Team   [id number]

## 2017-12-07 LAB
ALBUMIN SERPL ELPH-MCNC: 3.2 G/DL — LOW (ref 3.3–5)
ALP SERPL-CCNC: 153 U/L — HIGH (ref 40–120)
ALT FLD-CCNC: 74 U/L — HIGH (ref 4–33)
AST SERPL-CCNC: 69 U/L — HIGH (ref 4–32)
BACTERIA UR CULT: SIGNIFICANT CHANGE UP
BILIRUB DIRECT SERPL-MCNC: 0.5 MG/DL — HIGH (ref 0.1–0.2)
BILIRUB SERPL-MCNC: 3.8 MG/DL — HIGH (ref 0.2–1.2)
BUN SERPL-MCNC: 5 MG/DL — LOW (ref 7–23)
CALCIUM SERPL-MCNC: 8.6 MG/DL — SIGNIFICANT CHANGE UP (ref 8.4–10.5)
CANCER AG19-9 SERPL-ACNC: 4.8 U/ML — SIGNIFICANT CHANGE UP
CHLORIDE SERPL-SCNC: 107 MMOL/L — SIGNIFICANT CHANGE UP (ref 98–107)
CO2 SERPL-SCNC: 23 MMOL/L — SIGNIFICANT CHANGE UP (ref 22–31)
CREAT SERPL-MCNC: 0.71 MG/DL — SIGNIFICANT CHANGE UP (ref 0.5–1.3)
GLUCOSE SERPL-MCNC: 74 MG/DL — SIGNIFICANT CHANGE UP (ref 70–99)
HCT VFR BLD CALC: 30.5 % — LOW (ref 34.5–45)
HGB A MFR BLD: 95.3 % — SIGNIFICANT CHANGE UP
HGB A2 MFR BLD: 4.4 % — HIGH (ref 2.4–3.5)
HGB BLD-MCNC: 9.2 G/DL — LOW (ref 11.5–15.5)
HGB ELECT COMMENT: SIGNIFICANT CHANGE UP
HGB F MFR BLD: < 1 % — SIGNIFICANT CHANGE UP (ref 0–1.5)
MAGNESIUM SERPL-MCNC: 1.8 MG/DL — SIGNIFICANT CHANGE UP (ref 1.6–2.6)
MCHC RBC-ENTMCNC: 20.6 PG — LOW (ref 27–34)
MCHC RBC-ENTMCNC: 30.2 % — LOW (ref 32–36)
MCV RBC AUTO: 68.4 FL — LOW (ref 80–100)
NRBC # FLD: 0 — SIGNIFICANT CHANGE UP
PHOSPHATE SERPL-MCNC: 2.7 MG/DL — SIGNIFICANT CHANGE UP (ref 2.5–4.5)
PLATELET # BLD AUTO: 181 K/UL — SIGNIFICANT CHANGE UP (ref 150–400)
PMV BLD: 11 FL — SIGNIFICANT CHANGE UP (ref 7–13)
POTASSIUM SERPL-MCNC: 3.8 MMOL/L — SIGNIFICANT CHANGE UP (ref 3.5–5.3)
POTASSIUM SERPL-SCNC: 3.8 MMOL/L — SIGNIFICANT CHANGE UP (ref 3.5–5.3)
PROT SERPL-MCNC: 6.8 G/DL — SIGNIFICANT CHANGE UP (ref 6–8.3)
RBC # BLD: 4.46 M/UL — SIGNIFICANT CHANGE UP (ref 3.8–5.2)
RBC # FLD: 15.5 % — HIGH (ref 10.3–14.5)
SODIUM SERPL-SCNC: 143 MMOL/L — SIGNIFICANT CHANGE UP (ref 135–145)
SPECIMEN SOURCE: SIGNIFICANT CHANGE UP
WBC # BLD: 5.18 K/UL — SIGNIFICANT CHANGE UP (ref 3.8–10.5)
WBC # FLD AUTO: 5.18 K/UL — SIGNIFICANT CHANGE UP (ref 3.8–10.5)

## 2017-12-07 RX ORDER — ACETAMINOPHEN 500 MG
650 TABLET ORAL ONCE
Qty: 0 | Refills: 0 | Status: COMPLETED | OUTPATIENT
Start: 2017-12-07 | End: 2017-12-07

## 2017-12-07 RX ADMIN — PANTOPRAZOLE SODIUM 40 MILLIGRAM(S): 20 TABLET, DELAYED RELEASE ORAL at 12:20

## 2017-12-07 RX ADMIN — Medication 75 MICROGRAM(S): at 05:45

## 2017-12-07 RX ADMIN — Medication 1 TABLET(S): at 21:57

## 2017-12-07 RX ADMIN — Medication 650 MILLIGRAM(S): at 08:45

## 2017-12-07 RX ADMIN — Medication 200 MILLIGRAM(S): at 05:45

## 2017-12-07 RX ADMIN — HEPARIN SODIUM 5000 UNIT(S): 5000 INJECTION INTRAVENOUS; SUBCUTANEOUS at 05:45

## 2017-12-07 RX ADMIN — Medication 650 MILLIGRAM(S): at 09:15

## 2017-12-07 RX ADMIN — SODIUM CHLORIDE 100 MILLILITER(S): 9 INJECTION INTRAMUSCULAR; INTRAVENOUS; SUBCUTANEOUS at 18:43

## 2017-12-07 RX ADMIN — Medication 200 MILLIGRAM(S): at 18:43

## 2017-12-07 RX ADMIN — SODIUM CHLORIDE 100 MILLILITER(S): 9 INJECTION INTRAMUSCULAR; INTRAVENOUS; SUBCUTANEOUS at 06:03

## 2017-12-07 RX ADMIN — HEPARIN SODIUM 5000 UNIT(S): 5000 INJECTION INTRAVENOUS; SUBCUTANEOUS at 18:43

## 2017-12-07 NOTE — PROGRESS NOTE ADULT - PROBLEM SELECTOR PLAN 1
continue cipro, no flagyl as per Dr. Castro  no colonoscopy for now, will get colonoscopy in 8 weeks as has active flare at present  CLD at present, diet per surgery

## 2017-12-07 NOTE — PROGRESS NOTE ADULT - PROBLEM SELECTOR PLAN 2
s/p MRI with results as above, no liver mass or biliary tree abnormality  trend LFT daily  avoid hepatotoxic agents

## 2017-12-08 VITALS
HEART RATE: 77 BPM | TEMPERATURE: 98 F | DIASTOLIC BLOOD PRESSURE: 68 MMHG | OXYGEN SATURATION: 98 % | RESPIRATION RATE: 18 BRPM | SYSTOLIC BLOOD PRESSURE: 133 MMHG

## 2017-12-08 LAB
ALBUMIN SERPL ELPH-MCNC: 3.7 G/DL — SIGNIFICANT CHANGE UP (ref 3.3–5)
ALP SERPL-CCNC: 154 U/L — HIGH (ref 40–120)
ALT FLD-CCNC: 68 U/L — HIGH (ref 4–33)
AST SERPL-CCNC: 63 U/L — HIGH (ref 4–32)
BILIRUB SERPL-MCNC: 2.7 MG/DL — HIGH (ref 0.2–1.2)
BUN SERPL-MCNC: 3 MG/DL — LOW (ref 7–23)
BUN SERPL-MCNC: 3 MG/DL — LOW (ref 7–23)
CALCIUM SERPL-MCNC: 8.7 MG/DL — SIGNIFICANT CHANGE UP (ref 8.4–10.5)
CALCIUM SERPL-MCNC: 8.7 MG/DL — SIGNIFICANT CHANGE UP (ref 8.4–10.5)
CHLORIDE SERPL-SCNC: 104 MMOL/L — SIGNIFICANT CHANGE UP (ref 98–107)
CHLORIDE SERPL-SCNC: 104 MMOL/L — SIGNIFICANT CHANGE UP (ref 98–107)
CO2 SERPL-SCNC: 25 MMOL/L — SIGNIFICANT CHANGE UP (ref 22–31)
CO2 SERPL-SCNC: 25 MMOL/L — SIGNIFICANT CHANGE UP (ref 22–31)
CREAT SERPL-MCNC: 0.75 MG/DL — SIGNIFICANT CHANGE UP (ref 0.5–1.3)
CREAT SERPL-MCNC: 0.75 MG/DL — SIGNIFICANT CHANGE UP (ref 0.5–1.3)
GLUCOSE SERPL-MCNC: 89 MG/DL — SIGNIFICANT CHANGE UP (ref 70–99)
GLUCOSE SERPL-MCNC: 89 MG/DL — SIGNIFICANT CHANGE UP (ref 70–99)
HCT VFR BLD CALC: 32.9 % — LOW (ref 34.5–45)
HGB BLD-MCNC: 10.2 G/DL — LOW (ref 11.5–15.5)
MAGNESIUM SERPL-MCNC: 1.7 MG/DL — SIGNIFICANT CHANGE UP (ref 1.6–2.6)
MCHC RBC-ENTMCNC: 21.6 PG — LOW (ref 27–34)
MCHC RBC-ENTMCNC: 31 % — LOW (ref 32–36)
MCV RBC AUTO: 69.7 FL — LOW (ref 80–100)
NRBC # FLD: 0 — SIGNIFICANT CHANGE UP
PHOSPHATE SERPL-MCNC: 2.6 MG/DL — SIGNIFICANT CHANGE UP (ref 2.5–4.5)
PLATELET # BLD AUTO: 223 K/UL — SIGNIFICANT CHANGE UP (ref 150–400)
PMV BLD: 11.3 FL — SIGNIFICANT CHANGE UP (ref 7–13)
POTASSIUM SERPL-MCNC: 3.6 MMOL/L — SIGNIFICANT CHANGE UP (ref 3.5–5.3)
POTASSIUM SERPL-MCNC: 3.6 MMOL/L — SIGNIFICANT CHANGE UP (ref 3.5–5.3)
POTASSIUM SERPL-SCNC: 3.6 MMOL/L — SIGNIFICANT CHANGE UP (ref 3.5–5.3)
POTASSIUM SERPL-SCNC: 3.6 MMOL/L — SIGNIFICANT CHANGE UP (ref 3.5–5.3)
PROT SERPL-MCNC: 7.4 G/DL — SIGNIFICANT CHANGE UP (ref 6–8.3)
RBC # BLD: 4.72 M/UL — SIGNIFICANT CHANGE UP (ref 3.8–5.2)
RBC # FLD: 15.6 % — HIGH (ref 10.3–14.5)
SODIUM SERPL-SCNC: 143 MMOL/L — SIGNIFICANT CHANGE UP (ref 135–145)
SODIUM SERPL-SCNC: 143 MMOL/L — SIGNIFICANT CHANGE UP (ref 135–145)
WBC # BLD: 5.55 K/UL — SIGNIFICANT CHANGE UP (ref 3.8–10.5)
WBC # FLD AUTO: 5.55 K/UL — SIGNIFICANT CHANGE UP (ref 3.8–10.5)

## 2017-12-08 RX ORDER — FAMOTIDINE 10 MG/ML
0 INJECTION INTRAVENOUS
Qty: 0 | Refills: 0 | COMMUNITY

## 2017-12-08 RX ORDER — PANTOPRAZOLE SODIUM 20 MG/1
40 TABLET, DELAYED RELEASE ORAL
Qty: 0 | Refills: 0 | Status: DISCONTINUED | OUTPATIENT
Start: 2017-12-08 | End: 2017-12-08

## 2017-12-08 RX ADMIN — Medication 1 TABLET(S): at 11:27

## 2017-12-08 RX ADMIN — Medication 75 MICROGRAM(S): at 05:27

## 2017-12-08 RX ADMIN — HEPARIN SODIUM 5000 UNIT(S): 5000 INJECTION INTRAVENOUS; SUBCUTANEOUS at 05:27

## 2017-12-08 RX ADMIN — SODIUM CHLORIDE 100 MILLILITER(S): 9 INJECTION INTRAMUSCULAR; INTRAVENOUS; SUBCUTANEOUS at 06:13

## 2017-12-08 RX ADMIN — PANTOPRAZOLE SODIUM 40 MILLIGRAM(S): 20 TABLET, DELAYED RELEASE ORAL at 11:27

## 2017-12-08 NOTE — DISCHARGE NOTE ADULT - MEDICATION SUMMARY - MEDICATIONS TO TAKE
I will START or STAY ON the medications listed below when I get home from the hospital:    amoxicillin-clavulanate 875 mg-125 mg oral tablet  -- 1 tab(s) by mouth 2 times a day   -- Finish all this medication unless otherwise directed by prescriber.  Take with food or milk.    -- Indication: For UTI (urinary tract infection)    NexIUM 40 mg oral delayed release capsule  -- 1 cap(s) by mouth once a day  -- Indication: For Gastritis    Synthroid 75 mcg (0.075 mg) oral tablet  -- 1 tab(s) by mouth once a day  -- Indication: For Hypothyroid

## 2017-12-08 NOTE — PROGRESS NOTE ADULT - PROBLEM SELECTOR PLAN 1
antibiotics per surgery  no colonoscopy for now, will get colonoscopy in 8 weeks as has active flare at present  advance to low residue diet

## 2017-12-08 NOTE — DISCHARGE NOTE ADULT - PATIENT PORTAL LINK FT
“You can access the [facility] Patient Portal, offered by [facility], by registering with the following website: [email or web address]”

## 2017-12-08 NOTE — PROVIDER CONTACT NOTE (OTHER) - ASSESSMENT
Pt is A&0X4,  mild abd discomfort on RUQ. VS stable. ABD tender, non distended. No BM since admission.

## 2017-12-08 NOTE — DISCHARGE NOTE ADULT - ADDITIONAL INSTRUCTIONS
Please follow up with Dr. Castro within 1-2 weeks after discharge from the hospital. You may call [phone] to schedule an appointment.

## 2017-12-08 NOTE — DISCHARGE NOTE ADULT - CARE PLAN
Principal Discharge DX:	Colitis  Goal:	Recovery  Instructions for follow-up, activity and diet:	Resume regular diet and activity as tolerated.  Follow up with Dr. Baumann (GI) for colonoscopy in 8 weeks to better evaluate cause of colon wall thickening.  Follow up with Dr. Castro within 1-2 weeks from discharge.

## 2017-12-08 NOTE — DISCHARGE NOTE ADULT - CARE PROVIDER_API CALL
Khang Baumann (DO), Gastroenterology; Internal Medicine  [address] [place] [address]  Phone: [phone]  Fax: [phone]    Cullen Castro), Surgery  [address] [place] [address]  [address] [place] [address]  Phone: [phone]  Fax: [phone]

## 2017-12-08 NOTE — PROGRESS NOTE ADULT - ASSESSMENT
68yo F with UTI and cecal wall thickening in the setting of abdominal pain, likely diverticulitis vs malignancy, in addition to UTI and elevated LFTs  - CLD, advance as tolerated  - cipro for UTI  - will not add flagyl for possible diverticulitis as per Dr. Castro   - MRI negative for liver mass or dilated bile ducts  - colonoscopy in 8 weeks as per GI  - f/u c. diff as potential etiology of cecal thickening   - trend LFTs  - serial abdominal exams  - pain control    Lyla Hansen, PGY1  [id number]
66yo F with UTI and cecal wall thickening in the setting of abdominal pain, likely diverticulitis vs malignancy, in addition to UTI and elevated LFTs  - Diet: LRD  - augmentin for UTI  - MRI negative for liver mass or dilated bile ducts  - colonoscopy in 8 weeks as per GI  - trend LFTs  - serial abdominal exams  - pain control    Lyla Hansen, PGY1  [id number]
68yo F with UTI and cecal wall thickening in the setting of abdominal pain, likely diverticulitis   - NPO/IVF  - IV abx  - GI evaluation and possible colonoscopy to evaluate cecal wall thickening  - serial abdominal exams  - pain control    Lyla Hansen, PGY1  [id number]

## 2017-12-08 NOTE — DISCHARGE NOTE ADULT - PLAN OF CARE
Recovery Resume regular diet and activity as tolerated.  Follow up with Dr. Baumann (GI) for colonoscopy in 8 weeks to better evaluate cause of colon wall thickening.  Follow up with Dr. Castro within 1-2 weeks from discharge.

## 2017-12-08 NOTE — DISCHARGE NOTE ADULT - HOSPITAL COURSE
Patient is a 68 yo F w/ a hx of gastritis, HLD, Chronic LBP, Hypothyroid, S/P EGD 3-4 weeks ago c/w gastritis, Last Colonoscopy 7 years ago that was normal, s/p cholecystectomy , presented to the ED with RLQ pain x 2 days. Pain had been getting worse and was accompanied by 1 episode of diarrhea and vomiting. Work up in ED revealed a positive UTI and cecal thickening concerning for cecal diverticulitis vs cancer. Pt was admitted to medicine and surgery was consulted due to abdominal pain and cecal thickening.  She was transferred to the surgical service for management.  On HD 2 she was found to have elevated LFTS and T. Bili.  MRCP was done to work up possible retained stone or hepatic mass, however work up was negative.  Urine cultures were positive for GBS and antibiotics were adjusted accordingly.  GI was following and decided to perform a colonoscopy as an outpatient in 8 weeks once pain and swelling was resolved.  LFTs were monitored and showed a down trend throughout her stay.  Abdominal pain resolved on its own.  Diet was advanced and patient tolerated well.  On HD4, the patient was hemodynamically stable, was tolerating PO diet, was voiding urine and passing stool, was ambulating, and was comfortable with adequate pain control.

## 2017-12-08 NOTE — PROGRESS NOTE ADULT - SUBJECTIVE AND OBJECTIVE BOX
Surgery Progress Note    S: Patient seen and examined at the bedside. She is still complaining of mild abdominal pain.     T(C): 36.9 (17 @ 09:24), Max: 37.1 (17 @ 18:03)  HR: 72 (17 @ 09:24) (62 - 72)  BP: 130/69 (17 @ 09:24) (115/52 - 148/67)  RR: 18 (17 @ 09:24) (16 - 18)  SpO2: 100% (17 @ 09:24) (99% - 100%)  Wt(kg): --     @ 07:01  -   @ 07:00  --------------------------------------------------------  IN:    Oral Fluid: 580 mL    sodium chloride 0.9%.: 1100 mL  Total IN: 1680 mL    OUT:    Voided: 1590 mL  Total OUT: 1590 mL    Total NET: 90 mL      PE:   Gen: AAOX 3, NAD  Resp: non labored breathing  Abd: Soft, ND, tender to palpations in RLQ  Ext: WWP                          9.2    5.18  )-----------( 181      ( 07 Dec 2017 05:53 )             30.5         143  |  107  |  5<L>  ----------------------------<  74  3.8   |  23  |  0.71    Ca    8.6      07 Dec 2017 05:53  Phos  2.7       Mg     1.8         TPro  6.8  /  Alb  3.2<L>  /  TBili  3.8<H>  /  DBili  0.5<H>  /  AST  69<H>  /  ALT  74<H>  /  AlkPhos  153<H>      LIVER FUNCTIONS - ( 07 Dec 2017 05:53 )  Alb: 3.2 g/dL / Pro: 6.8 g/dL / ALK PHOS: 153 u/L / ALT: 74 u/L / AST: 69 u/L / GGT: x           PT/INR - ( 05 Dec 2017 16:05 )   PT: 11.8 SEC;   INR: 1.05          PTT - ( 05 Dec 2017 16:05 )  PTT:29.3 SEC  Urinalysis Basic - ( 05 Dec 2017 16:21 )    Color: YELLOW / Appearance: HAZY / S.023 / pH: 5.5  Gluc: NEGATIVE / Ketone: MODERATE  / Bili: NEGATIVE / Urobili: NORMAL E.U.   Blood: MODERATE / Protein: 30 / Nitrite: NEGATIVE   Leuk Esterase: LARGE / RBC: 10-25 / WBC >50   Sq Epi: FEW / Non Sq Epi: x / Bacteria: x
INTERVAL HPI/OVERNIGHT EVENTS: Pt seen and examined at bedside, no abdominal pain    MEDICATIONS  (STANDING):  ciprofloxacin   IVPB 400 milliGRAM(s) IV Intermittent every 12 hours  heparin  Injectable 5000 Unit(s) SubCutaneous every 12 hours  levothyroxine 75 MICROGram(s) Oral daily  pantoprazole  Injectable 40 milliGRAM(s) IV Push daily  sodium chloride 0.9%. 1000 milliLiter(s) (100 mL/Hr) IV Continuous <Continuous>    MEDICATIONS  (PRN):  acetaminophen   Tablet 650 milliGRAM(s) Oral every 12 hours PRN For Temp greater than 38 C (100.4 F)  ondansetron Injectable 4 milliGRAM(s) IV Push every 8 hours PRN Nausea and/or Vomiting      Allergies    No Known Allergies    Intolerances        ROS:   General:  No wt loss, fevers, chills, night sweats, fatigue,   Eyes:  Good vision, no reported pain  ENT:  No sore throat, pain, runny nose, dysphagia  CV:  No pain, palpitations, hypo/hypertension  Resp:  No dyspnea, cough, tachypnea, wheezing  GI:  No pain, No nausea, No vomiting, No diarrhea, No constipation, No weight loss, No fever, No pruritis, No rectal bleeding, No tarry stools, No dysphagia,  :  No pain, bleeding, incontinence, nocturia  Muscle:  No pain, weakness  Neuro:  No weakness, tingling, memory problems  Psych:  No fatigue, insomnia, mood problems, depression  Endocrine:  No polyuria, polydipsia, cold/heat intolerance  Heme:  No petechiae, ecchymosis, easy bruisability  Skin:  No rash, tattoos, scars, edema      PHYSICAL EXAM:   Vital Signs:  Vital Signs Last 24 Hrs  T(C): 36.9 (07 Dec 2017 09:24), Max: 37.1 (06 Dec 2017 18:03)  T(F): 98.5 (07 Dec 2017 09:24), Max: 98.7 (06 Dec 2017 18:03)  HR: 72 (07 Dec 2017 09:24) (62 - 72)  BP: 130/69 (07 Dec 2017 09:24) (115/52 - 148/67)  BP(mean): --  RR: 18 (07 Dec 2017 09:24) (16 - 18)  SpO2: 100% (07 Dec 2017 09:24) (99% - 100%)  Daily Height in cm: 157.48 (06 Dec 2017 20:46)    Daily     GENERAL:  Appears stated age, well-groomed, well-nourished, no distress  HEENT:  NC/AT,  conjunctivae clear and pink, no thyromegaly, nodules, adenopathy, no JVD, sclera -anicteric  CHEST:  Full & symmetric excursion, no increased effort, breath sounds clear  HEART:  Regular rhythm, S1, S2, no murmur/rub/S3/S4, no abdominal bruit, no edema  ABDOMEN:  Soft, non-tender, non-distended, normoactive bowel sounds,  no masses ,no hepato-splenomegaly, no signs of chronic liver disease  EXTEREMITIES:  no cyanosis,clubbing or edema  SKIN:  No rash/erythema/ecchymoses/petechiae/wounds/abscess/warm/dry  NEURO:  Alert, oriented, no asterixis, no tremor, no encephalopathy      LABS:                        9.2    5.18  )-----------( 181      ( 07 Dec 2017 05:53 )             30.5     12    143  |  107  |  5<L>  ----------------------------<  74  3.8   |  23  |  0.71    Ca    8.6      07 Dec 2017 05:53  Phos  2.7       Mg     1.8         TPro  6.8  /  Alb  3.2<L>  /  TBili  3.8<H>  /  DBili  0.5<H>  /  AST  69<H>  /  ALT  74<H>  /  AlkPhos  153<H>  12    PT/INR - ( 05 Dec 2017 16:05 )   PT: 11.8 SEC;   INR: 1.05          PTT - ( 05 Dec 2017 16:05 )  PTT:29.3 SEC  Urinalysis Basic - ( 05 Dec 2017 16:21 )    Color: YELLOW / Appearance: HAZY / S.023 / pH: 5.5  Gluc: NEGATIVE / Ketone: MODERATE  / Bili: NEGATIVE / Urobili: NORMAL E.U.   Blood: MODERATE / Protein: 30 / Nitrite: NEGATIVE   Leuk Esterase: LARGE / RBC: 10-25 / WBC >50   Sq Epi: FEW / Non Sq Epi: x / Bacteria: x        RADIOLOGY & ADDITIONAL TESTS:  < from: MR Abdomen w/ IV Cont (17 @ 22:34) >  EXAM:  MR ABDOMEN IC        PROCEDURE DATE:  Dec  6 2017         INTERPRETATION:  CLINICAL INFORMATION: Hepatitis, elevated LFTs. Evaluate   for liver mass.    COMPARISON: CT abdomen pelvis 2017.    PROCEDURE:   MRI of the abdomen was performed with and without intravenous contrast.  6 cc of Gadavist was administered and 1.5 cc were discarded.  MRCP was performed.    FINDINGS:    LOWER CHEST: Within normal limits.    LIVER: Normal morphology. No focal mass.  BILE DUCTS: Mild intrahepatic ductal dilatation, can be seen in the   setting of cholecystectomy.  GALLBLADDER: Cholecystectomy.  SPLEEN: Within normal limits.  PANCREAS: Normal T1 signal.  ADRENALS: Within normal limits.  KIDNEYS/URETERS: No hydronephrosis.    VISUALIZED PORTIONS:    BOWEL: Inflammatory changes in the cecum.  PERITONEUM: No ascites.  VESSELS: Within normal limits.  RETROPERITONEUM: No lymphadenopathy.    ABDOMINAL WALL: Within normal limits.  BONES: Within normal limits.    IMPRESSION:   No hepatic mass.    Normal biliary tree postcholecystectomy.    Inflammatory changes in the cecum, better seen on CT abdomen and pelvis   from 2017.                    SANDEE REAVES M.D., RADIOLOGY RESIDENT  This document has been electronically signed.  MISSAEL GALLARDO M.D., ATTENDING RADIOLOGIST  This document has been electronically signed. Dec  7 2017 11:23AM    < end of copied text >
INTERVAL HPI/OVERNIGHT EVENTS: Pt seen and examined at bedside, no more diarrhea    MEDICATIONS  (STANDING):  ciprofloxacin   IVPB 400 milliGRAM(s) IV Intermittent every 12 hours  heparin  Injectable 5000 Unit(s) SubCutaneous every 12 hours  levothyroxine 75 MICROGram(s) Oral daily  pantoprazole  Injectable 40 milliGRAM(s) IV Push daily  sodium chloride 0.9%. 1000 milliLiter(s) (100 mL/Hr) IV Continuous <Continuous>    MEDICATIONS  (PRN):  acetaminophen   Tablet 650 milliGRAM(s) Oral every 12 hours PRN For Temp greater than 38 C (100.4 F)  ondansetron Injectable 4 milliGRAM(s) IV Push every 8 hours PRN Nausea and/or Vomiting      Allergies    No Known Allergies    Intolerances        ROS:   General:  No wt loss, fevers, chills, night sweats, fatigue,   Eyes:  Good vision, no reported pain  ENT:  No sore throat, pain, runny nose, dysphagia  CV:  No pain, palpitations, hypo/hypertension  Resp:  No dyspnea, cough, tachypnea, wheezing  GI:  No pain, No nausea, No vomiting, No diarrhea, No constipation, No weight loss, No fever, No pruritis, No rectal bleeding, No tarry stools, No dysphagia,  :  No pain, bleeding, incontinence, nocturia  Muscle:  No pain, weakness  Neuro:  No weakness, tingling, memory problems  Psych:  No fatigue, insomnia, mood problems, depression  Endocrine:  No polyuria, polydipsia, cold/heat intolerance  Heme:  No petechiae, ecchymosis, easy bruisability  Skin:  No rash, tattoos, scars, edema      PHYSICAL EXAM:   Vital Signs:  Vital Signs Last 24 Hrs  T(C): 36.9 (07 Dec 2017 09:24), Max: 37.1 (06 Dec 2017 18:03)  T(F): 98.5 (07 Dec 2017 09:24), Max: 98.7 (06 Dec 2017 18:03)  HR: 72 (07 Dec 2017 09:24) (62 - 72)  BP: 130/69 (07 Dec 2017 09:24) (115/52 - 148/67)  BP(mean): --  RR: 18 (07 Dec 2017 09:24) (16 - 18)  SpO2: 100% (07 Dec 2017 09:24) (99% - 100%)  Daily Height in cm: 157.48 (06 Dec 2017 20:46)    Daily     GENERAL:  Appears stated age, well-groomed, well-nourished, no distress  HEENT:  NC/AT,  conjunctivae clear and pink, no thyromegaly, nodules, adenopathy, no JVD, sclera -anicteric  CHEST:  Full & symmetric excursion, no increased effort, breath sounds clear  HEART:  Regular rhythm, S1, S2, no murmur/rub/S3/S4, no abdominal bruit, no edema  ABDOMEN:  Soft, non-tender, non-distended, normoactive bowel sounds,  no masses ,no hepato-splenomegaly, no signs of chronic liver disease  EXTEREMITIES:  no cyanosis,clubbing or edema  SKIN:  No rash/erythema/ecchymoses/petechiae/wounds/abscess/warm/dry  NEURO:  Alert, oriented, no asterixis, no tremor, no encephalopathy      LABS:                        9.2    5.18  )-----------( 181      ( 07 Dec 2017 05:53 )             30.5     12    143  |  107  |  5<L>  ----------------------------<  74  3.8   |  23  |  0.71    Ca    8.6      07 Dec 2017 05:53  Phos  2.7       Mg     1.8         TPro  6.8  /  Alb  3.2<L>  /  TBili  3.8<H>  /  DBili  0.5<H>  /  AST  69<H>  /  ALT  74<H>  /  AlkPhos  153<H>  12    PT/INR - ( 05 Dec 2017 16:05 )   PT: 11.8 SEC;   INR: 1.05          PTT - ( 05 Dec 2017 16:05 )  PTT:29.3 SEC  Urinalysis Basic - ( 05 Dec 2017 16:21 )    Color: YELLOW / Appearance: HAZY / S.023 / pH: 5.5  Gluc: NEGATIVE / Ketone: MODERATE  / Bili: NEGATIVE / Urobili: NORMAL E.U.   Blood: MODERATE / Protein: 30 / Nitrite: NEGATIVE   Leuk Esterase: LARGE / RBC: 10-25 / WBC >50   Sq Epi: FEW / Non Sq Epi: x / Bacteria: x        RADIOLOGY & ADDITIONAL TESTS:  < from: MR Abdomen w/ IV Cont (17 @ 22:34) >  EXAM:  MR ABDOMEN IC        PROCEDURE DATE:  Dec  6 2017         INTERPRETATION:  CLINICAL INFORMATION: Hepatitis, elevated LFTs. Evaluate   for liver mass.    COMPARISON: CT abdomen pelvis 2017.    PROCEDURE:   MRI of the abdomen was performed with and without intravenous contrast.  6 cc of Gadavist was administered and 1.5 cc were discarded.  MRCP was performed.    FINDINGS:    LOWER CHEST: Within normal limits.    LIVER: Normal morphology. No focal mass.  BILE DUCTS: Mild intrahepatic ductal dilatation, can be seen in the   setting of cholecystectomy.  GALLBLADDER: Cholecystectomy.  SPLEEN: Within normal limits.  PANCREAS: Normal T1 signal.  ADRENALS: Within normal limits.  KIDNEYS/URETERS: No hydronephrosis.    VISUALIZED PORTIONS:    BOWEL: Inflammatory changes in the cecum.  PERITONEUM: No ascites.  VESSELS: Within normal limits.  RETROPERITONEUM: No lymphadenopathy.    ABDOMINAL WALL: Within normal limits.  BONES: Within normal limits.    IMPRESSION:   No hepatic mass.    Normal biliary tree postcholecystectomy.    Inflammatory changes in the cecum, better seen on CT abdomen and pelvis   from 2017.                    SANDEE REAVES M.D., RADIOLOGY RESIDENT  This document has been electronically signed.  MISSAEL GALLARDO M.D., ATTENDING RADIOLOGIST  This document has been electronically signed. Dec  7 2017 11:23AM    < end of copied text >
Surgery Progress Note    S: Patient seen and examined at the bedside. No events overnight.  She is no longer complaining of pain.  She has been afebrile.    ICU Vital Signs Last 24 Hrs  T(C): 36.9 (08 Dec 2017 11:10), Max: 36.9 (08 Dec 2017 05:22)  T(F): 98.5 (08 Dec 2017 11:10), Max: 98.5 (08 Dec 2017 11:10)  HR: 77 (08 Dec 2017 11:10) (63 - 77)  BP: 133/68 (08 Dec 2017 11:10) (120/66 - 144/67)  BP(mean): --  ABP: --  ABP(mean): --  RR: 18 (08 Dec 2017 11:10) (16 - 18)  SpO2: 98% (08 Dec 2017 11:10) (98% - 100%)  	    PE:   Gen: AAOX 3, NAD  Resp: non labored breathing  Abd: Soft, ND, NT  Ext: WWP                          10.2   5.55  )-----------( 223      ( 08 Dec 2017 07:15 )             32.9     12-08    143  |  104  |  3<L>  ----------------------------<  89  3.6   |  25  |  0.75    Ca    8.7      08 Dec 2017 07:15  Phos  2.6     12-08  Mg     1.7     12-08    TPro  6.8  /  Alb  3.2<L>  /  TBili  3.8<H>  /  DBili  0.5<H>  /  AST  69<H>  /  ALT  74<H>  /  AlkPhos  153<H>  12-07    LIVER FUNCTIONS - ( 07 Dec 2017 05:53 )  Alb: 3.2 g/dL / Pro: 6.8 g/dL / ALK PHOS: 153 u/L / ALT: 74 u/L / AST: 69 u/L / GGT: x
Surgery Progress Note    S: Patient seen and examined in the ED, resting comfortably in bed. Pain is well controlled with medications.  Denies n/v./    T(C): 36.8 (12-06-17 @ 12:36), Max: 38.6 (12-05-17 @ 14:57)  HR: 64 (12-06-17 @ 12:36) (63 - 115)  BP: 115/60 (12-06-17 @ 12:36) (105/61 - 152/78)  RR: 16 (12-06-17 @ 12:36) (14 - 18)  SpO2: 100% (12-06-17 @ 12:36) (100% - 100%)  Wt(kg): --                          9.5    8.46  )-----------( 176      ( 06 Dec 2017 05:37 )             30.5     CAPILLARY BLOOD GLUCOSE      POCT Blood Glucose.: 75 mg/dL (06 Dec 2017 12:55)      PE:   Gen: AAOX 3, NAD  Resp: non labored breathing  Abd: Soft, ND, tender to palpations in RLQ  Ext: WWP

## 2017-12-10 LAB
BACTERIA BLD CULT: SIGNIFICANT CHANGE UP
BACTERIA BLD CULT: SIGNIFICANT CHANGE UP

## 2017-12-29 NOTE — PATIENT PROFILE ADULT. - FUNCTIONAL LEVEL PRIOR: COMMUNICATION
Chest PT and incentive spirometry  Pain control
Endorses noncompliance with incentive spirometry  Pt educated and understands how to properly use the incentive spirometry
Lifestyle modifications including aerobic exercise and low sodium/low fat diet
(0) understands/communicates without difficulty

## 2018-07-02 NOTE — PATIENT PROFILE ADULT. - SURGICAL SITE INCISION
Patient is here today for vaginal discharge.  Her last pap showed: 2017 normal    Her last menses started June 23 and she reports her cycles to be regular.   Current form of birth control ocp and is satisfied.  Latex:  Patient denies allergy to latex.  Medications reviewed with patient.  Tobacco use verified.  Allergies verified.    Patient would like communication of their results via:      [employer]     Patient's current [facility] status: Active.     Review of Systems:  GENERAL:  Patient denies weigh gain and weight loss.  SKIN: Patient denies hair loss, rash, other skin problems.  HEENT: Patient denies blurred vision and headache.  RESPIRATORY: Patient denies chronic cough, difficulty breathing, wheezing, asthma.  BREASTS: Patient denies breast mass, breast pain, breast swelling, nipple discharge, nipple pain, recent breast size changes and skin changes.  GASTROINTESTINAL: Patient denies bloody stool, incontinence of stool or rectal bleeding.  GYNECOLOGICAL: Patient denies vaginal discharge, vaginal dryness, vaginal itching/burning.  Patient denies excessive menstrual bleeding,menstrual irregularities, painful menstruation, painful intercourse.    GENITOURINARY:  Patient denies incontinence, painful urination, urgency, urinary retention, urine leakage.  PSYCHIATRIC: Patient denies anxiety, depression, suicidal ideation.  ENDOCRINE: Patient denies hot flashes, libido change, sexual dysfunction.  HEMATOLOGY:  Patient denies abnormal bleeding, excessive bleeding.   no

## 2020-04-07 ENCOUNTER — OUTPATIENT (OUTPATIENT)
Dept: OUTPATIENT SERVICES | Facility: HOSPITAL | Age: 70
LOS: 1 days | End: 2020-04-07

## 2020-04-07 ENCOUNTER — INPATIENT (INPATIENT)
Facility: HOSPITAL | Age: 70
LOS: 1 days | Discharge: NURSING, REHAB OR LONG-TERM CARE | End: 2020-04-09
Attending: FAMILY MEDICINE | Admitting: INTERNAL MEDICINE
Payer: MEDICARE

## 2020-04-07 DIAGNOSIS — Z90.49 ACQUIRED ABSENCE OF OTHER SPECIFIED PARTS OF DIGESTIVE TRACT: Chronic | ICD-10-CM

## 2020-04-07 PROCEDURE — 99285 EMERGENCY DEPT VISIT HI MDM: CPT

## 2020-04-07 PROCEDURE — 71045 X-RAY EXAM CHEST 1 VIEW: CPT | Mod: 26

## 2020-04-08 ENCOUNTER — OUTPATIENT (OUTPATIENT)
Dept: OUTPATIENT SERVICES | Facility: HOSPITAL | Age: 70
LOS: 1 days | End: 2020-04-08

## 2020-04-08 DIAGNOSIS — Z90.49 ACQUIRED ABSENCE OF OTHER SPECIFIED PARTS OF DIGESTIVE TRACT: Chronic | ICD-10-CM

## 2020-04-09 ENCOUNTER — OUTPATIENT (OUTPATIENT)
Dept: OUTPATIENT SERVICES | Facility: HOSPITAL | Age: 70
LOS: 1 days | End: 2020-04-09

## 2020-04-09 ENCOUNTER — INPATIENT (INPATIENT)
Facility: HOSPITAL | Age: 70
LOS: 17 days | Discharge: HOME | End: 2020-04-27
Attending: INTERNAL MEDICINE | Admitting: INTERNAL MEDICINE

## 2020-04-09 DIAGNOSIS — Z90.49 ACQUIRED ABSENCE OF OTHER SPECIFIED PARTS OF DIGESTIVE TRACT: Chronic | ICD-10-CM

## 2020-04-14 ENCOUNTER — OUTPATIENT (OUTPATIENT)
Dept: OUTPATIENT SERVICES | Facility: HOSPITAL | Age: 70
LOS: 1 days | End: 2020-04-14

## 2020-04-14 DIAGNOSIS — Z90.49 ACQUIRED ABSENCE OF OTHER SPECIFIED PARTS OF DIGESTIVE TRACT: Chronic | ICD-10-CM

## 2020-04-21 ENCOUNTER — OUTPATIENT (OUTPATIENT)
Dept: OUTPATIENT SERVICES | Facility: HOSPITAL | Age: 70
LOS: 1 days | End: 2020-04-21

## 2020-04-21 DIAGNOSIS — Z90.49 ACQUIRED ABSENCE OF OTHER SPECIFIED PARTS OF DIGESTIVE TRACT: Chronic | ICD-10-CM

## 2020-04-27 ENCOUNTER — OUTPATIENT (OUTPATIENT)
Dept: OUTPATIENT SERVICES | Facility: HOSPITAL | Age: 70
LOS: 1 days | End: 2020-04-27

## 2020-04-27 DIAGNOSIS — Z90.49 ACQUIRED ABSENCE OF OTHER SPECIFIED PARTS OF DIGESTIVE TRACT: Chronic | ICD-10-CM

## 2022-01-24 PROBLEM — M54.5 LOW BACK PAIN: Chronic | Status: ACTIVE | Noted: 2017-12-05

## 2022-01-24 PROBLEM — E78.00 PURE HYPERCHOLESTEROLEMIA, UNSPECIFIED: Chronic | Status: ACTIVE | Noted: 2017-12-05

## 2022-01-24 PROBLEM — K29.70 GASTRITIS, UNSPECIFIED, WITHOUT BLEEDING: Chronic | Status: ACTIVE | Noted: 2017-12-05

## 2022-01-24 PROBLEM — E03.9 HYPOTHYROIDISM, UNSPECIFIED: Chronic | Status: ACTIVE | Noted: 2017-12-05

## 2022-07-28 NOTE — DISCHARGE NOTE ADULT - CARE PROVIDERS DIRECT ADDRESSES
Report given to staff RN. All questions answered. Pt to be transported by tech to room 0543. No IV fluids running and pt on RA.      Tanvi Gallegos RN  07/28/22 [id number] ,DirectAddress_Unknown,DirectAddress_Unknown

## 2025-04-16 NOTE — PATIENT PROFILE ADULT. - NS PRO OT REFERRAL QUES 1 YN
----- Message from Kimi Vega PA-C sent at 4/16/2025  9:05 AM CDT -----  Could yall please schedule this patient an MRI brain w wo and to see Carole after complete?    Thanks,  Kimi   ----- Message -----  From: Carole Howard PA-C  Sent: 4/11/2025   5:27 PM CDT  To: Kimi Vega PA-C      ----- Message -----  From: Interface, Rad Results In  Sent: 4/11/2025   3:23 PM CDT  To: Carole Howard PA-C    
S/w pt to schedule MRI and f/u appt. Offered MRI 4/22 at 6PM and f/u 4/23 at 2:30PM. Provided locations, time and dates. Pt accepted appts. LVM for pt's cousin Kimberly, who is the person that typically takes him to these appointments.   
no